# Patient Record
Sex: FEMALE | Race: WHITE
[De-identification: names, ages, dates, MRNs, and addresses within clinical notes are randomized per-mention and may not be internally consistent; named-entity substitution may affect disease eponyms.]

---

## 2018-04-16 ENCOUNTER — HOSPITAL ENCOUNTER (OUTPATIENT)
Dept: HOSPITAL 47 - RADMRIMAIN | Age: 33
Discharge: HOME | End: 2018-04-16
Payer: COMMERCIAL

## 2018-04-16 DIAGNOSIS — G93.9: ICD-10-CM

## 2018-04-16 DIAGNOSIS — R93.7: Primary | ICD-10-CM

## 2018-04-16 DIAGNOSIS — G35: ICD-10-CM

## 2018-04-16 PROCEDURE — 70553 MRI BRAIN STEM W/O & W/DYE: CPT

## 2018-04-16 PROCEDURE — 72156 MRI NECK SPINE W/O & W/DYE: CPT

## 2018-04-16 NOTE — MR
MRI BRAIN  WITH CONTRAST

 

CLINICAL HISTORY: G35 Multiple sclerosis / M54.2 Cervicalgia

 

 

TECHNIQUE: Multiplanar, multisequence imaging of the brain and brainstem is performed without and wit
h IV contrast, 10 cc of Gadavist is administered intravenously.  Demyelinating disease protocol with 
additional Sagittal Flair sequence performed.

 

Comparison: 12/19/2008

 

FINDINGS: 

 

T2 Lesions Present :  Yes

Approximate Number of Lesions: Approximately 5 lesions present the right cerebral hemisphere with ge
roximately 4 lesions left cerebral hemisphere.

Locations Identified : Periventricular and juxtacortical.

Size of Largest Lesion(s): 

1. Stable large confluent area of increased signal on T2 FLAIR data set mid to posterior right corona
 radiata measures approximately 3.2 x 2.1 cm.

2 largest lesion left cerebral hemisphere measures approximately 10 mm x 9 mm posterior left corona r
adiata adjacent to the lateral ventricle. This lesion is smaller in size with prior measurement of 13
 x 11 mm.

Enhancing Lesion(s) Present: None

Change from Prior: New juxtacortical lesion right frontal lobe measures 4 mm. Left-sided corona radia
ta lesion is smaller in size.

 

 

Diffusion weighted images demonstrate no evidence of a recent infarct or other diffusion abnormality.
  There is no worrisome extra-axial fluid collection.  The ventricular system and cisternal spaces ar
e normal in size and appearance.  The brain volume is age appropriate.

 

Midline structures demonstrate normal morphology.  The craniocervical junction appears within normal 
limits.  Post contrast images demonstrate no abnormal enhancement. The dural venous sinuses appear pa
tent.  The visualized sinuses are clear and the globes are intact.

 

IMPRESSION: 

1. Findings compatible with demyelinating disease. Large confluent area of abnormal increased signal 
remains essentially unchanged. Small new lesion right frontal juxtacortical region. Lesion within the
 posterior left corona radiata is slightly smaller in size.

 

EXAMINATION TYPE: MR brain/cspine wo/w

 

DATE OF EXAM: 4/16/2018 7:28 AM

 

COMPARISON: NONE

 

HISTORY: Multiple sclerosis /  Cervicalgia

 

CONTRAST: The patient was injected with 10 mL intravenous Gadavist gadolinium contrast.

 

Multiplanar MultiSpin echo imaging of the cervical spine was performed.  

 

C2-C3: No evidence for degenerative disc disease.  No disc bulge/herniation or protrusion.  No Canal 
stenosis.  Foramina are patent bilaterally.

 

C3-C4: No evidence for degenerative disc disease.  No disc bulge/herniation or protrusion.  No Canal 
stenosis.  Foramina are patent bilaterally.

 

C4-C5: No evidence for degenerative disc disease.  No disc bulge/herniation or protrusion.  No Canal 
stenosis.  Foramina are patent bilaterally.

 

C5-C6: No evidence for degenerative disc disease.  No disc bulge/herniation or protrusion.  No Canal 
stenosis.  Foramina are patent bilaterally.

 

C6-C7:No evidence for degenerative disc disease.  No disc bulge/herniation or protrusion.  No Canal s
tenosis.  Foramina are patent bilaterally. 

 

C7-T1: No evidence for degenerative disc disease.  No disc bulge/herniation or protrusion.  No Canal 
stenosis.  Foramina are patent bilaterally.

 

There is vague increased signal within the substance of the cervical cord at the C3-4 level measuring
 12 x 7 mm. In additional subtle area of increased signal is noted within the substance of the cervic
al spinal cord at the C7-T1 level measuring 9 x 5 mm. Plaques of demyelination difficult to exclude. 
No cervical spine fracture.  There is normal alignment. Craniovertebral junction relationships are wi
thin normal limits.  No pathologic enhancement.

 

IMPRESSION:

 

1. Areas of abnormal signal within the substance of the cervical spinal cord as discussed above. Plaq
ues of demyelination difficult to exclude.

2. Normal-appearing disc spaces.

## 2021-02-23 ENCOUNTER — HOSPITAL ENCOUNTER (OUTPATIENT)
Dept: HOSPITAL 47 - LABWHC1 | Age: 36
Discharge: HOME | End: 2021-02-23
Attending: NURSE PRACTITIONER
Payer: COMMERCIAL

## 2021-02-23 DIAGNOSIS — Z51.81: ICD-10-CM

## 2021-02-23 DIAGNOSIS — E55.9: Primary | ICD-10-CM

## 2021-02-23 DIAGNOSIS — G35: ICD-10-CM

## 2021-02-23 LAB
BASOPHILS # BLD AUTO: 0.05 X 10*3/UL (ref 0–0.1)
BASOPHILS NFR BLD AUTO: 0.7 %
EOSINOPHIL # BLD AUTO: 0.49 X 10*3/UL (ref 0.04–0.35)
EOSINOPHIL NFR BLD AUTO: 6.5 %
ERYTHROCYTE [DISTWIDTH] IN BLOOD BY AUTOMATED COUNT: 4.78 X 10*6/UL (ref 4.1–5.2)
ERYTHROCYTE [DISTWIDTH] IN BLOOD: 12.3 % (ref 11.5–14.5)
HCT VFR BLD AUTO: 41.5 % (ref 37.2–46.3)
HGB BLD-MCNC: 14 G/DL (ref 12–15)
LYMPHOCYTES # SPEC AUTO: 1.54 X 10*3/UL (ref 0.9–5)
LYMPHOCYTES NFR SPEC AUTO: 20.5 %
MCH RBC QN AUTO: 29.3 PG (ref 27–32)
MCHC RBC AUTO-ENTMCNC: 33.7 G/DL (ref 32–37)
MCV RBC AUTO: 86.8 FL (ref 80–97)
MONOCYTES # BLD AUTO: 0.54 X 10*3/UL (ref 0.2–1)
MONOCYTES NFR BLD AUTO: 7.2 %
NEUTROPHILS # BLD AUTO: 4.86 X 10*3/UL (ref 1.8–7.7)
NEUTROPHILS NFR BLD AUTO: 64.7 %
PLATELET # BLD AUTO: 289 X 10*3/UL (ref 140–440)
WBC # BLD AUTO: 7.51 X 10*3/UL (ref 4.5–10)

## 2021-02-23 PROCEDURE — 85025 COMPLETE CBC W/AUTO DIFF WBC: CPT

## 2021-02-23 PROCEDURE — 36415 COLL VENOUS BLD VENIPUNCTURE: CPT

## 2021-02-23 PROCEDURE — 80053 COMPREHEN METABOLIC PANEL: CPT

## 2021-02-23 PROCEDURE — 82306 VITAMIN D 25 HYDROXY: CPT

## 2021-02-23 PROCEDURE — 82607 VITAMIN B-12: CPT

## 2021-02-23 PROCEDURE — 84207 ASSAY OF VITAMIN B-6: CPT

## 2021-02-24 LAB
ALBUMIN SERPL-MCNC: 4.7 G/DL (ref 3.8–4.9)
ALBUMIN/GLOB SERPL: 2.61 G/DL (ref 1.6–3.17)
ALP SERPL-CCNC: 81 U/L (ref 41–126)
ALT SERPL-CCNC: 14 U/L (ref 8–44)
ANION GAP SERPL CALC-SCNC: 8.9 MMOL/L (ref 4–12)
AST SERPL-CCNC: 19 U/L (ref 13–35)
BUN SERPL-SCNC: 9 MG/DL (ref 9–27)
BUN/CREAT SERPL: 11.25 RATIO (ref 12–20)
CALCIUM SPEC-MCNC: 9.4 MG/DL (ref 8.7–10.3)
CHLORIDE SERPL-SCNC: 109 MMOL/L (ref 96–109)
CO2 SERPL-SCNC: 25.1 MMOL/L (ref 21.6–31.8)
GLOBULIN SER CALC-MCNC: 1.8 G/DL (ref 1.6–3.3)
GLUCOSE SERPL-MCNC: 91 MG/DL (ref 70–110)
POTASSIUM SERPL-SCNC: 4.1 MMOL/L (ref 3.5–5.5)
PROT SERPL-MCNC: 6.5 G/DL (ref 6.2–8.2)
SODIUM SERPL-SCNC: 143 MMOL/L (ref 135–145)
VIT B12 SERPL-MCNC: 460 PG/ML (ref 200–944)

## 2022-09-02 ENCOUNTER — HOSPITAL ENCOUNTER (OUTPATIENT)
Dept: HOSPITAL 47 - LABWHC1 | Age: 37
Discharge: HOME | End: 2022-09-02
Attending: NURSE PRACTITIONER
Payer: COMMERCIAL

## 2022-09-02 DIAGNOSIS — H46.8: ICD-10-CM

## 2022-09-02 DIAGNOSIS — Z71.3: ICD-10-CM

## 2022-09-02 DIAGNOSIS — R42: ICD-10-CM

## 2022-09-02 DIAGNOSIS — R53.83: ICD-10-CM

## 2022-09-02 DIAGNOSIS — E55.9: ICD-10-CM

## 2022-09-02 DIAGNOSIS — Z00.01: Primary | ICD-10-CM

## 2022-09-02 DIAGNOSIS — E53.9: ICD-10-CM

## 2022-09-02 DIAGNOSIS — E66.01: ICD-10-CM

## 2022-09-02 DIAGNOSIS — R13.10: ICD-10-CM

## 2022-09-02 LAB
ALBUMIN SERPL-MCNC: 4.5 G/DL (ref 3.8–4.9)
ALBUMIN/GLOB SERPL: 1.81 G/DL (ref 1.6–3.17)
ALP SERPL-CCNC: 76 U/L (ref 41–126)
ALT SERPL-CCNC: 15 U/L (ref 8–44)
ANION GAP SERPL CALC-SCNC: 12.1 MMOL/L (ref 10–18)
AST SERPL-CCNC: 18 U/L (ref 13–35)
BASOPHILS # BLD AUTO: 0.07 X 10*3/UL (ref 0–0.1)
BASOPHILS NFR BLD AUTO: 1.3 %
BUN SERPL-SCNC: 10.2 MG/DL (ref 9–27)
BUN/CREAT SERPL: 14.05 RATIO (ref 12–20)
CALCIUM SPEC-MCNC: 9.6 MG/DL (ref 8.7–10.3)
CHLORIDE SERPL-SCNC: 107 MMOL/L (ref 96–109)
CHOLEST SERPL-MCNC: 170 MG/DL (ref 0–200)
CO2 SERPL-SCNC: 21.8 MMOL/L (ref 20–27.5)
EOSINOPHIL # BLD AUTO: 0.32 X 10*3/UL (ref 0.04–0.35)
EOSINOPHIL NFR BLD AUTO: 6.1 %
ERYTHROCYTE [DISTWIDTH] IN BLOOD BY AUTOMATED COUNT: 4.61 X 10*6/UL (ref 4.1–5.2)
ERYTHROCYTE [DISTWIDTH] IN BLOOD: 12.6 % (ref 11.5–14.5)
GLOBULIN SER CALC-MCNC: 2.5 G/DL (ref 1.6–3.3)
GLUCOSE SERPL-MCNC: 84 MG/DL (ref 70–110)
HCT VFR BLD AUTO: 40 % (ref 37.2–46.3)
HDLC SERPL-MCNC: 66.4 MG/DL (ref 40–60)
HGB BLD-MCNC: 13.5 G/DL (ref 12–15)
IMM GRANULOCYTES BLD QL AUTO: 0.6 %
LDLC SERPL CALC-MCNC: 89 MG/DL (ref 0–131)
LYMPHOCYTES # SPEC AUTO: 1.17 X 10*3/UL (ref 0.9–5)
LYMPHOCYTES NFR SPEC AUTO: 22.2 %
MCH RBC QN AUTO: 29.3 PG (ref 27–32)
MCHC RBC AUTO-ENTMCNC: 33.8 G/DL (ref 32–37)
MCV RBC AUTO: 86.8 FL (ref 80–97)
MONOCYTES # BLD AUTO: 0.46 X 10*3/UL (ref 0.2–1)
MONOCYTES NFR BLD AUTO: 8.7 %
NEUTROPHILS # BLD AUTO: 3.23 X 10*3/UL (ref 1.8–7.7)
NEUTROPHILS NFR BLD AUTO: 61.1 %
NRBC BLD AUTO-RTO: 0 /100 WBCS (ref 0–0)
PLATELET # BLD AUTO: 273 X 10*3/UL (ref 140–440)
POTASSIUM SERPL-SCNC: 4 MMOL/L (ref 3.5–5.5)
PROT SERPL-MCNC: 7 G/DL (ref 6.2–8.2)
SODIUM SERPL-SCNC: 140 MMOL/L (ref 135–145)
T4 FREE SERPL-MCNC: 1.11 NG/DL (ref 0.8–1.8)
TRIGL SERPL-MCNC: 72.9 MG/DL (ref 0–149)
VIT B12 SERPL-MCNC: 378 PG/ML (ref 200–944)
VLDLC SERPL CALC-MCNC: 14.58 MG/DL (ref 5–40)
WBC # BLD AUTO: 5.28 X 10*3/UL (ref 4.5–10)

## 2022-09-02 PROCEDURE — 80061 LIPID PANEL: CPT

## 2022-09-02 PROCEDURE — 84443 ASSAY THYROID STIM HORMONE: CPT

## 2022-09-02 PROCEDURE — 85025 COMPLETE CBC W/AUTO DIFF WBC: CPT

## 2022-09-02 PROCEDURE — 84439 ASSAY OF FREE THYROXINE: CPT

## 2022-09-02 PROCEDURE — 80053 COMPREHEN METABOLIC PANEL: CPT

## 2022-09-02 PROCEDURE — 82306 VITAMIN D 25 HYDROXY: CPT

## 2022-09-02 PROCEDURE — 82607 VITAMIN B-12: CPT

## 2022-09-02 PROCEDURE — 36415 COLL VENOUS BLD VENIPUNCTURE: CPT

## 2022-09-02 PROCEDURE — 84207 ASSAY OF VITAMIN B-6: CPT

## 2023-06-14 ENCOUNTER — HOSPITAL ENCOUNTER (OUTPATIENT)
Dept: HOSPITAL 47 - LABWHC1 | Age: 38
Discharge: HOME | End: 2023-06-14
Attending: NURSE PRACTITIONER
Payer: COMMERCIAL

## 2023-06-14 DIAGNOSIS — H53.8: ICD-10-CM

## 2023-06-14 DIAGNOSIS — E55.9: Primary | ICD-10-CM

## 2023-06-14 DIAGNOSIS — R55: ICD-10-CM

## 2023-06-14 DIAGNOSIS — R42: ICD-10-CM

## 2023-06-14 DIAGNOSIS — E53.9: ICD-10-CM

## 2023-06-14 DIAGNOSIS — G35: ICD-10-CM

## 2023-06-14 DIAGNOSIS — G43.909: ICD-10-CM

## 2023-06-14 LAB
ALBUMIN SERPL-MCNC: 4.2 D/DL (ref 3.8–4.9)
ALBUMIN/GLOB SERPL: 2.1 RATIO (ref 1.6–3.17)
ALP SERPL-CCNC: 73 U/L (ref 41–126)
ALT SERPL-CCNC: 18 U/L (ref 8–44)
ANION GAP SERPL CALC-SCNC: 13.8 MMOL/L (ref 4–12)
AST SERPL-CCNC: 16 U/L (ref 13–35)
BASOPHILS # BLD AUTO: 0.09 X 10*3/UL (ref 0–0.1)
BASOPHILS NFR BLD AUTO: 1.6 %
BUN SERPL-SCNC: 8.7 MG/DL (ref 9–27)
BUN/CREAT SERPL: 12.43 RATIO (ref 12–20)
CALCIUM SPEC-MCNC: 9.2 MG/DL (ref 8.7–10.3)
CHLORIDE SERPL-SCNC: 105 MMOL/L (ref 96–109)
CO2 SERPL-SCNC: 24.2 MMOL/L (ref 21.6–31.8)
EOSINOPHIL # BLD AUTO: 0.37 X 10*3/UL (ref 0.04–0.35)
EOSINOPHIL NFR BLD AUTO: 6.8 %
ERYTHROCYTE [DISTWIDTH] IN BLOOD BY AUTOMATED COUNT: 4.3 X 10*6/UL (ref 4.1–5.2)
ERYTHROCYTE [DISTWIDTH] IN BLOOD: 13 % (ref 11.5–14.5)
GLOBULIN SER CALC-MCNC: 2 D/DL (ref 1.6–3.3)
GLUCOSE SERPL-MCNC: 84 MG/DL (ref 70–110)
HCT VFR BLD AUTO: 37.5 % (ref 37.2–46.3)
HGB BLD-MCNC: 12.5 D/DL (ref 12–15)
IMM GRANULOCYTES BLD QL AUTO: 0.4 %
LYMPHOCYTES # SPEC AUTO: 1.28 X 10*3/UL (ref 0.9–5)
LYMPHOCYTES NFR SPEC AUTO: 23.4 %
MCH RBC QN AUTO: 29.1 PG (ref 27–32)
MCHC RBC AUTO-ENTMCNC: 33.3 D/DL (ref 32–37)
MCV RBC AUTO: 87.2 FL (ref 80–97)
MONOCYTES # BLD AUTO: 0.54 X 10*3/UL (ref 0.2–1)
MONOCYTES NFR BLD AUTO: 9.9 %
NEUTROPHILS # BLD AUTO: 3.16 X 10*3/UL (ref 1.8–7.7)
NEUTROPHILS NFR BLD AUTO: 57.9 %
NRBC BLD AUTO-RTO: 0 X 10*3/UL (ref 0–0.01)
PLATELET # BLD AUTO: 264 X 10*3/UL (ref 140–440)
POTASSIUM SERPL-SCNC: 4.1 MMOL/L (ref 3.5–5.5)
PROT SERPL-MCNC: 6.2 D/DL (ref 6.2–8.2)
SODIUM SERPL-SCNC: 143 MMOL/L (ref 135–145)
T4 FREE SERPL-MCNC: 1.13 NG/DL (ref 0.8–1.8)
VIT B12 SERPL-MCNC: 425 PG/ML (ref 200–944)
WBC # BLD AUTO: 5.46 X 10*3/UL (ref 4.5–10)

## 2023-06-14 PROCEDURE — 36415 COLL VENOUS BLD VENIPUNCTURE: CPT

## 2023-06-14 PROCEDURE — 84439 ASSAY OF FREE THYROXINE: CPT

## 2023-06-14 PROCEDURE — 85025 COMPLETE CBC W/AUTO DIFF WBC: CPT

## 2023-06-14 PROCEDURE — 80053 COMPREHEN METABOLIC PANEL: CPT

## 2023-06-14 PROCEDURE — 82607 VITAMIN B-12: CPT

## 2023-06-14 PROCEDURE — 84207 ASSAY OF VITAMIN B-6: CPT

## 2023-06-14 PROCEDURE — 84443 ASSAY THYROID STIM HORMONE: CPT

## 2023-06-14 PROCEDURE — 84481 FREE ASSAY (FT-3): CPT

## 2023-06-14 PROCEDURE — 82306 VITAMIN D 25 HYDROXY: CPT

## 2023-10-15 ENCOUNTER — HOSPITAL ENCOUNTER (EMERGENCY)
Dept: HOSPITAL 47 - EC | Age: 38
Discharge: HOME | End: 2023-10-15
Payer: COMMERCIAL

## 2023-10-15 VITALS — HEART RATE: 70 BPM | DIASTOLIC BLOOD PRESSURE: 81 MMHG | SYSTOLIC BLOOD PRESSURE: 135 MMHG | TEMPERATURE: 97.9 F

## 2023-10-15 VITALS — RESPIRATION RATE: 20 BRPM

## 2023-10-15 DIAGNOSIS — U07.1: Primary | ICD-10-CM

## 2023-10-15 DIAGNOSIS — Z87.891: ICD-10-CM

## 2023-10-15 LAB
ALBUMIN SERPL-MCNC: 4.1 G/DL (ref 3.5–5)
ALP SERPL-CCNC: 87 U/L (ref 38–126)
ALT SERPL-CCNC: 25 U/L (ref 4–34)
ANION GAP SERPL CALC-SCNC: 8 MMOL/L
AST SERPL-CCNC: 30 U/L (ref 14–36)
BASOPHILS # BLD AUTO: 0 K/UL (ref 0–0.2)
BASOPHILS NFR BLD AUTO: 0 %
BUN SERPL-SCNC: 9 MG/DL (ref 7–17)
CALCIUM SPEC-MCNC: 9.1 MG/DL (ref 8.4–10.2)
CHLORIDE SERPL-SCNC: 109 MMOL/L (ref 98–107)
CO2 SERPL-SCNC: 22 MMOL/L (ref 22–30)
EOSINOPHIL # BLD AUTO: 0.1 K/UL (ref 0–0.7)
EOSINOPHIL NFR BLD AUTO: 1 %
ERYTHROCYTE [DISTWIDTH] IN BLOOD BY AUTOMATED COUNT: 4.66 M/UL (ref 3.8–5.4)
ERYTHROCYTE [DISTWIDTH] IN BLOOD: 12.9 % (ref 11.5–15.5)
GLUCOSE SERPL-MCNC: 100 MG/DL (ref 74–99)
HCT VFR BLD AUTO: 40.6 % (ref 34–46)
HGB BLD-MCNC: 13.5 GM/DL (ref 11.4–16)
LYMPHOCYTES # SPEC AUTO: 0.7 K/UL (ref 1–4.8)
LYMPHOCYTES NFR SPEC AUTO: 9 %
MCH RBC QN AUTO: 29.1 PG (ref 25–35)
MCHC RBC AUTO-ENTMCNC: 33.4 G/DL (ref 31–37)
MCV RBC AUTO: 87.2 FL (ref 80–100)
MONOCYTES # BLD AUTO: 0.6 K/UL (ref 0–1)
MONOCYTES NFR BLD AUTO: 8 %
NEUTROPHILS # BLD AUTO: 6.4 K/UL (ref 1.3–7.7)
NEUTROPHILS NFR BLD AUTO: 80 %
PLATELET # BLD AUTO: 252 K/UL (ref 150–450)
POTASSIUM SERPL-SCNC: 4.3 MMOL/L (ref 3.5–5.1)
PROT SERPL-MCNC: 7 G/DL (ref 6.3–8.2)
SODIUM SERPL-SCNC: 139 MMOL/L (ref 137–145)
WBC # BLD AUTO: 8 K/UL (ref 3.8–10.6)

## 2023-10-15 PROCEDURE — 36415 COLL VENOUS BLD VENIPUNCTURE: CPT

## 2023-10-15 PROCEDURE — 99284 EMERGENCY DEPT VISIT MOD MDM: CPT

## 2023-10-15 PROCEDURE — 85025 COMPLETE CBC W/AUTO DIFF WBC: CPT

## 2023-10-15 PROCEDURE — 80053 COMPREHEN METABOLIC PANEL: CPT

## 2023-10-15 PROCEDURE — 96374 THER/PROPH/DIAG INJ IV PUSH: CPT

## 2023-10-15 PROCEDURE — 96375 TX/PRO/DX INJ NEW DRUG ADDON: CPT

## 2023-10-15 PROCEDURE — 96361 HYDRATE IV INFUSION ADD-ON: CPT

## 2023-10-15 NOTE — ED
Nausea/Vomiting/Diarrhea HPI





- General


Chief complaint: Nausea/Vomiting/Diarrhea


Stated complaint: vomiting/covid


Time Seen by Provider: 10/15/23 08:26


Source: patient, RN notes reviewed


Mode of arrival: ambulatory


Limitations: no limitations





- History of Present Illness


Initial comments: 


38-year-old female presents emergency Department chief complaint nausea 

vomiting.  Patient states she started with URI symptoms on Friday and tested 

positive for Covid 19 patient states that she woke up with dizziness, vomits not

stopping.  Patient reports fever or chills bodyaches no localized abdominal pain

patient denies any chance pregnancy no dysuria no hematuria no other significant

complaints.








- Related Data


                                Home Medications











 Medication  Instructions  Recorded  Confirmed


 


Fluticasone Nasal Spray [Flonase 2 spr EA NOSTRIL DAILY 16





Nasal Spray]   


 


Multivitamins, Thera [Multivitamin] 1 tab PO DAILY 16


 


Teriflunomide [Aubagio] 14 mg PO DAILY 16


 


Cetirizine HCl [Zyrtec] 10 mg PO DAILY 18








                                  Previous Rx's











 Medication  Instructions  Recorded


 


Meclizine [Antivert] 25 mg PO TID PRN #15 tab 10/15/23


 


Ondansetron Odt [Zofran Odt] 4 mg PO Q8HR PRN #10 tab 10/15/23











                                    Allergies











Allergy/AdvReac Type Severity Reaction Status Date / Time


 


No Known Allergies Allergy   Verified 10/15/23 08:23














Review of Systems


ROS Statement: 


Those systems with pertinent positive or pertinent negative responses have been 

documented in the HPI.





ROS Other: All systems not noted in ROS Statement are negative.





Past Medical History


Additional Past Medical History / Comment(s): MS


History of Any Multi-Drug Resistant Organisms: None Reported


Past Surgical History:  Section, Orthopedic Surgery, Tonsillectomy


Additional Past Surgical History / Comment(s): lumpectomy right breast, right 

knee


Past Psychological History: No Psychological Hx Reported


Smoking Status: Former smoker


Past Alcohol Use History: Rare


Past Drug Use History: None Reported





General Exam


Limitations: no limitations


General appearance: alert, in no apparent distress


Head exam: Present: atraumatic, normocephalic, normal inspection


Eye exam: Present: normal appearance, PERRL, EOMI.  Absent: scleral icterus, 

conjunctival injection, periorbital swelling


Neck exam: Present: normal inspection.  Absent: tenderness, meningismus, 

lymphadenopathy


Respiratory exam: Present: normal lung sounds bilaterally.  Absent: respiratory 

distress, wheezes, rales, rhonchi, stridor


Cardiovascular Exam: Present: regular rate, normal rhythm, normal heart sounds. 

Absent: systolic murmur, diastolic murmur, rubs, gallop, clicks


GI/Abdominal exam: Present: soft, normal bowel sounds.  Absent: distended, 

tenderness, guarding, rebound, rigid





Course


                                   Vital Signs











  10/15/23 10/15/23 10/15/23





  08:20 08:53 10:09


 


Temperature 98.2 F 97.6 F 98.4 F


 


Pulse Rate 73 73 63


 


Respiratory 20 20 20





Rate   


 


Blood Pressure 130/80 139/87 131/81


 


O2 Sat by Pulse 98 98 98





Oximetry   














Medical Decision Making





- Medical Decision Making


Was pt. sent in by a medical professional or institution (, PA, NP, urgent 

care, hospital, or nursing home...) When possible be specific


@  -No


Did you speak to anyone other than the patient for history (EMS, parent, family,

police, friend...)? What history was obtained from this source 


@  -No


Did you review nursing and triage notes (agree or disagree)?  Why? 


@  -I reviewed and agree with nursing and triage notes


Were old charts reviewed (outside hosp., previous admission, EMS record, old 

EKG, old radiological studies, urgent care reports/EKG's, nursing home records)?

Report findings 


@  -No old charts were reviewed


Differential Diagnosis (chest pain, altered mental status, abdominal pain women,

abdominal pain men, vaginal bleeding, weakness, fever, dyspnea, syncope, 

headache, dizziness, GI bleed, back pain, seizure, CVA, palpatations, mental 

health, musculoskeletal)? 


@  -covid , nausea vomiting, gastroparesis


EKG interpreted by me (3pts min.).


@  -Non-


X-rays interpreted by me (1pt min.).


@  -None done


CT interpreted by me (1pt min.).


@  -None done


U/S interpreted by me (1pt. min.).


@  -None done


What testing was considered but not performed or refused? (CT, X-rays, U/S, 

labs)? Why?


@  -None


What meds were considered but not given or refused? Why?


@  -None


Did you discuss the management of the patient with other professionals 

(professionals i.e. , PA, NP, lab, RT, psych nurse, , , 

teacher, , )? Give summary


@  -No


Was smoking cessation discussed for >3mins.?


@  -No


Was critical care preformed (if so, how long)?


@  -No


Were there social determinants of health that impacted care today? How? 

(Homelessness, low income, unemployed, alcoholism, drug addiction, 

transportation, low edu. Level, literacy, decrease access to med. care, [facility], 

rehab)?


@  -No


Was there de-escalation of care discussed even if they declined (Discuss DNR or 

withdrawal of care, Hospice)? DNR status


@  -No


What co-morbidities impacted this encounter? (DM, HTN, Smoking, COPD, CAD, Ca

ncer, CVA, ARF, Chemo, Hep., AIDS, mental health diagnosis, sleep apnea, morbid 

obesity)?


@  -None


Was patient admitted / discharged? Hospital course, mention meds given and 

route, prescriptions, significant lab abnormalities, going to OR and other 

pertinent info.


@  -Discharge patient feels improved after IV fluids, antiemetics.  Patient dis

charged with antiemetics return parameters were discussed. 


Undiagnosed new problem with uncertain prognosis?


@  -No


Drug Therapy requiring intensive monitoring for toxicity (Heparin, Nitro, 

Insulin, Cardizem)?


@  -No


Were any procedures done?


@  -No


Diagnosis/symptom?


@  -[covid 19, vomiting


Acute, or Chronic, or Acute on Chronic?


@  -Acute


Uncomplicated (without systemic symptoms) or Complicated (systemic symptoms)?


@  -Uncomplicated


Side effects of treatment?


@  -No


Exacerbation, Progression, or Severe Exacerbation?


@  -No


Poses a threat to life or bodily function? How? (Chest pain, USA, MI, pneumonia,

 PE, COPD, DKA, ARF, appy, cholecystitis, CVA, Diverticulitis, Homicidal, 

Suicidal, threat to staff... and all critical care pts)


@  -No








- Lab Data


Result diagrams: 


                                 10/15/23 08:54





                                 10/15/23 08:54


                                   Lab Results











  10/15/23 10/15/23 Range/Units





  08:54 08:54 


 


WBC  8.0   (3.8-10.6)  k/uL


 


RBC  4.66   (3.80-5.40)  m/uL


 


Hgb  13.5   (11.4-16.0)  gm/dL


 


Hct  40.6   (34.0-46.0)  %


 


MCV  87.2   (80.0-100.0)  fL


 


MCH  29.1   (25.0-35.0)  pg


 


MCHC  33.4   (31.0-37.0)  g/dL


 


RDW  12.9   (11.5-15.5)  %


 


Plt Count  252   (150-450)  k/uL


 


MPV  7.0   


 


Neutrophils %  80   %


 


Lymphocytes %  9   %


 


Monocytes %  8   %


 


Eosinophils %  1   %


 


Basophils %  0   %


 


Neutrophils #  6.4   (1.3-7.7)  k/uL


 


Lymphocytes #  0.7 L   (1.0-4.8)  k/uL


 


Monocytes #  0.6   (0-1.0)  k/uL


 


Eosinophils #  0.1   (0-0.7)  k/uL


 


Basophils #  0.0   (0-0.2)  k/uL


 


Sodium   139  (137-145)  mmol/L


 


Potassium   4.3  (3.5-5.1)  mmol/L


 


Chloride   109 H  ()  mmol/L


 


Carbon Dioxide   22  (22-30)  mmol/L


 


Anion Gap   8  mmol/L


 


BUN   9  (7-17)  mg/dL


 


Creatinine   0.53  (0.52-1.04)  mg/dL


 


Est GFR (CKD-EPI)AfAm   >90  (>60 ml/min/1.73 sqM)  


 


Est GFR (CKD-EPI)NonAf   >90  (>60 ml/min/1.73 sqM)  


 


Glucose   100 H  (74-99)  mg/dL


 


Calcium   9.1  (8.4-10.2)  mg/dL


 


Total Bilirubin   0.5  (0.2-1.3)  mg/dL


 


AST   30  (14-36)  U/L


 


ALT   25  (4-34)  U/L


 


Alkaline Phosphatase   87  ()  U/L


 


Total Protein   7.0  (6.3-8.2)  g/dL


 


Albumin   4.1  (3.5-5.0)  g/dL














Disposition


Clinical Impression: 


 Nausea and vomiting, COVID-19





Disposition: HOME SELF-CARE


Condition: Stable


Instructions (If sedation given, give patient instructions):  Acute Nausea and 

Vomiting (ED)


Additional Instructions: 


Please return to the Emergency Department if symptoms worsen or any other 

concerns.


Prescriptions: 


Meclizine [Antivert] 25 mg PO TID PRN #15 tab


 PRN Reason: Vertigo


Ondansetron Odt [Zofran Odt] 4 mg PO Q8HR PRN #10 tab


 PRN Reason: Nausea


Is patient prescribed a controlled substance at d/c from ED?: No


Referrals: 


Gianluca Caldwell Jr,  [Primary Care Provider] - 1-2 days


Time of Disposition: 10:37